# Patient Record
Sex: MALE | Race: WHITE | Employment: UNEMPLOYED | URBAN - METROPOLITAN AREA
[De-identification: names, ages, dates, MRNs, and addresses within clinical notes are randomized per-mention and may not be internally consistent; named-entity substitution may affect disease eponyms.]

---

## 2019-01-17 ENCOUNTER — OFFICE VISIT (OUTPATIENT)
Dept: URGENT CARE | Facility: CLINIC | Age: 54
End: 2019-01-17

## 2019-01-17 VITALS
OXYGEN SATURATION: 95 % | HEART RATE: 80 BPM | WEIGHT: 187 LBS | RESPIRATION RATE: 16 BRPM | BODY MASS INDEX: 26.18 KG/M2 | SYSTOLIC BLOOD PRESSURE: 118 MMHG | TEMPERATURE: 97.2 F | HEIGHT: 71 IN | DIASTOLIC BLOOD PRESSURE: 80 MMHG

## 2019-01-17 DIAGNOSIS — L03.211 FACIAL CELLULITIS: ICD-10-CM

## 2019-01-17 PROCEDURE — 99203 OFFICE O/P NEW LOW 30 MIN: CPT | Performed by: FAMILY MEDICINE

## 2019-01-17 RX ORDER — DOXYCYCLINE HYCLATE 100 MG
100 TABLET ORAL 2 TIMES DAILY
Qty: 20 TAB | Refills: 0 | Status: SHIPPED | OUTPATIENT
Start: 2019-01-17 | End: 2019-01-27

## 2019-01-17 ASSESSMENT — ENCOUNTER SYMPTOMS
FOCAL WEAKNESS: 0
EYE DISCHARGE: 0
WEIGHT LOSS: 0
SINUS PAIN: 0
EYE REDNESS: 0
SENSORY CHANGE: 0

## 2019-01-17 NOTE — PROGRESS NOTES
"Subjective:      Garcia Davidson is a 53 y.o. male who presents with Facial Swelling (x yesterday, Swelling on Lt. side of face)            Onset yesterday pain and swelling centered at his left upper lip.  He has had recent boils associated with his face and neck that have drained pus.  No fever.  No past medical history or family history of MRSA.  No dental pain or gingival swelling.  No association with his eyes.  He has had bilateral symptoms without vesicles.  Pain is mild to moderate.  Swelling is severe.  No respiratory compromise.  No other aggravating or alleviating factors.        Review of Systems   Constitutional: Negative for malaise/fatigue and weight loss.   HENT: Negative for ear discharge, ear pain and sinus pain.    Eyes: Negative for discharge and redness.   Skin: Negative for itching and rash.   Neurological: Negative for sensory change and focal weakness.      .  Medications, Allergies, and current problem list reviewed today in Epic  No known immune compromise     Objective:     /80 (BP Location: Left arm, Patient Position: Sitting, BP Cuff Size: Large adult)   Pulse 80   Temp 36.2 °C (97.2 °F) (Temporal)   Resp 16   Ht 1.803 m (5' 11\")   Wt 84.8 kg (187 lb)   SpO2 95%   BMI 26.08 kg/m²      Physical Exam   Constitutional: He is oriented to person, place, and time. He appears well-developed and well-nourished. No distress.   HENT:   Head: Normocephalic and atraumatic.       Right Ear: External ear normal.   Left Ear: External ear normal.   Eyes: Conjunctivae are normal.   Neck: Neck supple.   Cardiovascular: Normal rate, regular rhythm and normal heart sounds.    Pulmonary/Chest: Effort normal and breath sounds normal.   Lymphadenopathy:     He has no cervical adenopathy.   Neurological: He is alert and oriented to person, place, and time.               Assessment/Plan:     1. Facial cellulitis  doxycycline (VIBRAMYCIN) 100 MG Tab     Differential diagnosis, natural history, " supportive care, and indications for immediate follow-up discussed at length.     I think there is a good possibility that this will require incision and drainage within the next week.  He may come find me at my next scheduled shift tomorrow or Monday.

## 2021-03-15 DIAGNOSIS — Z23 NEED FOR VACCINATION: ICD-10-CM

## 2023-03-07 ENCOUNTER — OFFICE VISIT (OUTPATIENT)
Dept: URGENT CARE | Facility: CLINIC | Age: 58
End: 2023-03-07

## 2023-03-07 VITALS
HEIGHT: 71 IN | HEART RATE: 92 BPM | SYSTOLIC BLOOD PRESSURE: 118 MMHG | TEMPERATURE: 98 F | DIASTOLIC BLOOD PRESSURE: 82 MMHG | RESPIRATION RATE: 16 BRPM | WEIGHT: 200 LBS | OXYGEN SATURATION: 94 % | BODY MASS INDEX: 28 KG/M2

## 2023-03-07 DIAGNOSIS — J22 LRTI (LOWER RESPIRATORY TRACT INFECTION): ICD-10-CM

## 2023-03-07 DIAGNOSIS — J98.01 BRONCHOSPASM: ICD-10-CM

## 2023-03-07 PROCEDURE — 99203 OFFICE O/P NEW LOW 30 MIN: CPT | Performed by: PHYSICIAN ASSISTANT

## 2023-03-07 RX ORDER — DOXYCYCLINE 100 MG/1
100 CAPSULE ORAL 2 TIMES DAILY
Qty: 14 CAPSULE | Refills: 0 | Status: SHIPPED | OUTPATIENT
Start: 2023-03-07 | End: 2023-03-14

## 2023-03-07 RX ORDER — PREDNISONE 20 MG/1
TABLET ORAL
Qty: 5 TABLET | Refills: 0 | Status: SHIPPED | OUTPATIENT
Start: 2023-03-07

## 2023-03-07 ASSESSMENT — ENCOUNTER SYMPTOMS
COUGH: 1
NAUSEA: 0
CHILLS: 1
SPUTUM PRODUCTION: 1
SHORTNESS OF BREATH: 0
WHEEZING: 1
SORE THROAT: 0
DIARRHEA: 0
ABDOMINAL PAIN: 0
FEVER: 1
MYALGIAS: 1
VOMITING: 0

## 2023-03-08 NOTE — PROGRESS NOTES
Subjective:     Garcia Davidson  is a 57 y.o. male who presents for Cough (X 4 days, productive cough, shortness of breath and wheezing)      Cough  This is a new problem. The current episode started in the past 7 days. Associated symptoms include chills (subj), a fever, myalgias and wheezing. Pertinent negatives include no rash, sore throat or shortness of breath.     Patient presents urgent care out of concern for pneumonia.  He describes last 4 days of cough and congestion.  He describes congestion to sinuses as well as chest.  He describes productive sputum as well as wheezing at times.  He notes subjective fevers chills and body aches.  He has not checked his temperature.  Patient denies ear pain.  Denies nausea vomiting abdominal pain diarrhea or rash.  Denies history of asthma or COPD.  Notes past medical history of pneumonia many years ago with this feels similar.  He is concerned for pneumonia today.  He notes history of presumed COVID in early in the pandemic due to loss of taste and smell.  He has not taken a COVID test.  Patient has treated symptoms with electrolyte packets and orange juice.    Review of Systems   Constitutional:  Positive for chills (subj) and fever.   HENT:  Positive for congestion. Negative for sore throat.    Respiratory:  Positive for cough, sputum production and wheezing. Negative for shortness of breath.    Gastrointestinal:  Negative for abdominal pain, diarrhea, nausea and vomiting.   Musculoskeletal:  Positive for myalgias.   Skin:  Negative for rash.     Medications:    This patient does not have an active medication from one of the medication groupers.    Allergies: Patient has no known allergies.    Problem List: Garcia Davidson does not have a problem list on file.    Surgical History:  No past surgical history on file.    Past Social Hx: Garcia Davidson  reports that he has quit smoking. He has never used smokeless tobacco.     Past Family Hx:  Garcia Davidson  "family history is not on file.     Problem list, medications, and allergies reviewed by myself today in Epic.     Objective:   /82 (BP Location: Left arm, Patient Position: Sitting, BP Cuff Size: Large adult)   Pulse 92   Temp 36.7 °C (98 °F) (Temporal)   Resp 16   Ht 1.803 m (5' 11\")   Wt 90.7 kg (200 lb)   SpO2 94%   BMI 27.89 kg/m²     Physical Exam  Vitals and nursing note reviewed.   Constitutional:       General: He is not in acute distress.     Appearance: Normal appearance. He is well-developed. He is not diaphoretic.   HENT:      Head: Normocephalic and atraumatic.      Right Ear: External ear normal.      Left Ear: External ear normal.      Nose: Nose normal.   Eyes:      General: No scleral icterus.        Right eye: No discharge.         Left eye: No discharge.      Conjunctiva/sclera: Conjunctivae normal.   Pulmonary:      Effort: Pulmonary effort is normal. No accessory muscle usage or respiratory distress.      Breath sounds: Rhonchi (mild) present. No decreased breath sounds, wheezing or rales.   Musculoskeletal:         General: Normal range of motion.      Cervical back: Neck supple.   Skin:     General: Skin is warm and dry.      Coloration: Skin is not pale.   Neurological:      Mental Status: He is alert and oriented to person, place, and time.      Coordination: Coordination normal.     Patient is cash pay and states he would like to avoid additional expenses.  Will avoid PCR COVID testing and chest x-ray due to cost restriction for patient.    Assessment/Plan:   Assessment      1. LRTI (lower respiratory tract infection)  - doxycycline (MONODOX) 100 MG capsule; Take 1 Capsule by mouth 2 times a day for 7 days.  Dispense: 14 Capsule; Refill: 0    2. Bronchospasm  - predniSONE (DELTASONE) 20 MG Tab; Take one tab PO qd x 5d  Dispense: 5 Tablet; Refill: 0  Supportive care is reviewed with patient/caregiver - recommend to push PO fluids and electrolytes, Cautioned regarding potential " side effects of steroid, avoid nsaids while using   take full course of Rx, take with probiotics, observe for resolution  Return to clinic with lack of resolution or progression of symptoms.  ER precautions with any worsening symptoms are reviewed with patient/caregiver and they do express understanding    I have worn an N95 mask, gloves and eye protection for the entire encounter with this patient.     Differential diagnosis, natural history, supportive care, and indications for immediate follow-up discussed.

## 2024-03-03 ENCOUNTER — APPOINTMENT (OUTPATIENT)
Dept: RADIOLOGY | Facility: MEDICAL CENTER | Age: 59
DRG: 897 | End: 2024-03-03
Attending: INTERNAL MEDICINE

## 2024-03-03 ENCOUNTER — HOSPITAL ENCOUNTER (INPATIENT)
Facility: MEDICAL CENTER | Age: 59
LOS: 1 days | DRG: 897 | End: 2024-03-04
Attending: EMERGENCY MEDICINE | Admitting: INTERNAL MEDICINE

## 2024-03-03 ENCOUNTER — APPOINTMENT (OUTPATIENT)
Dept: RADIOLOGY | Facility: MEDICAL CENTER | Age: 59
DRG: 897 | End: 2024-03-03
Attending: EMERGENCY MEDICINE

## 2024-03-03 ENCOUNTER — APPOINTMENT (OUTPATIENT)
Dept: CARDIOLOGY | Facility: MEDICAL CENTER | Age: 59
DRG: 897 | End: 2024-03-03
Attending: INTERNAL MEDICINE

## 2024-03-03 DIAGNOSIS — R42 DIZZINESS: ICD-10-CM

## 2024-03-03 DIAGNOSIS — R06.00 DYSPNEA, UNSPECIFIED TYPE: ICD-10-CM

## 2024-03-03 DIAGNOSIS — R11.2 NAUSEA AND VOMITING, UNSPECIFIED VOMITING TYPE: ICD-10-CM

## 2024-03-03 DIAGNOSIS — R07.9 CHEST PAIN, UNSPECIFIED TYPE: ICD-10-CM

## 2024-03-03 DIAGNOSIS — J90 PLEURAL EFFUSION: ICD-10-CM

## 2024-03-03 DIAGNOSIS — E87.1 HYPONATREMIA: ICD-10-CM

## 2024-03-03 PROBLEM — R94.31 QT PROLONGATION: Status: ACTIVE | Noted: 2024-03-03

## 2024-03-03 PROBLEM — R07.89 OTHER CHEST PAIN: Status: ACTIVE | Noted: 2024-03-03

## 2024-03-03 PROBLEM — F10.29 ALCOHOL DEPENDENCE WITH UNSPECIFIED ALCOHOL-INDUCED DISORDER (HCC): Status: ACTIVE | Noted: 2024-03-03

## 2024-03-03 LAB
ABO + RH BLD: NORMAL
ABO GROUP BLD: NORMAL
ALBUMIN SERPL BCP-MCNC: 3.9 G/DL (ref 3.2–4.9)
ALBUMIN/GLOB SERPL: 1.1 G/DL
ALP SERPL-CCNC: 107 U/L (ref 30–99)
ALT SERPL-CCNC: 17 U/L (ref 2–50)
AMPHET UR QL SCN: NEGATIVE
ANION GAP SERPL CALC-SCNC: 18 MMOL/L (ref 7–16)
APTT PPP: 25.5 SEC (ref 24.7–36)
AST SERPL-CCNC: 33 U/L (ref 12–45)
BARBITURATES UR QL SCN: NEGATIVE
BASOPHILS # BLD AUTO: 0.3 % (ref 0–1.8)
BASOPHILS # BLD: 0.03 K/UL (ref 0–0.12)
BENZODIAZ UR QL SCN: NEGATIVE
BILIRUB SERPL-MCNC: 1.4 MG/DL (ref 0.1–1.5)
BLD GP AB SCN SERPL QL: NORMAL
BUN SERPL-MCNC: 7 MG/DL (ref 8–22)
BZE UR QL SCN: POSITIVE
CALCIUM ALBUM COR SERPL-MCNC: 8.5 MG/DL (ref 8.5–10.5)
CALCIUM SERPL-MCNC: 8.4 MG/DL (ref 8.5–10.5)
CANNABINOIDS UR QL SCN: NEGATIVE
CHLORIDE SERPL-SCNC: 92 MMOL/L (ref 96–112)
CO2 SERPL-SCNC: 19 MMOL/L (ref 20–33)
CREAT SERPL-MCNC: 0.66 MG/DL (ref 0.5–1.4)
EKG IMPRESSION: NORMAL
EOSINOPHIL # BLD AUTO: 0 K/UL (ref 0–0.51)
EOSINOPHIL NFR BLD: 0 % (ref 0–6.9)
ERYTHROCYTE [DISTWIDTH] IN BLOOD BY AUTOMATED COUNT: 39.2 FL (ref 35.9–50)
EST. AVERAGE GLUCOSE BLD GHB EST-MCNC: 111 MG/DL
ETHANOL BLD-MCNC: <10.1 MG/DL
FENTANYL UR QL: NEGATIVE
GFR SERPLBLD CREATININE-BSD FMLA CKD-EPI: 107 ML/MIN/1.73 M 2
GLOBULIN SER CALC-MCNC: 3.4 G/DL (ref 1.9–3.5)
GLUCOSE SERPL-MCNC: 129 MG/DL (ref 65–99)
HBA1C MFR BLD: 5.5 % (ref 4–5.6)
HCT VFR BLD AUTO: 45.1 % (ref 42–52)
HGB BLD-MCNC: 16.8 G/DL (ref 14–18)
IMM GRANULOCYTES # BLD AUTO: 0.04 K/UL (ref 0–0.11)
IMM GRANULOCYTES NFR BLD AUTO: 0.3 % (ref 0–0.9)
INR PPP: 1.02 (ref 0.87–1.13)
LIPASE SERPL-CCNC: 15 U/L (ref 11–82)
LV EJECT FRACT  99904: 60
LV EJECT FRACT MOD 2C 99903: 74.78
LV EJECT FRACT MOD 4C 99902: 59.13
LV EJECT FRACT MOD BP 99901: 66.63
LYMPHOCYTES # BLD AUTO: 0.86 K/UL (ref 1–4.8)
LYMPHOCYTES NFR BLD: 7.2 % (ref 22–41)
MAGNESIUM SERPL-MCNC: 1.5 MG/DL (ref 1.5–2.5)
MCH RBC QN AUTO: 32.4 PG (ref 27–33)
MCHC RBC AUTO-ENTMCNC: 37.3 G/DL (ref 32.3–36.5)
MCV RBC AUTO: 86.9 FL (ref 81.4–97.8)
METHADONE UR QL SCN: NEGATIVE
MONOCYTES # BLD AUTO: 0.67 K/UL (ref 0–0.85)
MONOCYTES NFR BLD AUTO: 5.6 % (ref 0–13.4)
NEUTROPHILS # BLD AUTO: 10.37 K/UL (ref 1.82–7.42)
NEUTROPHILS NFR BLD: 86.6 % (ref 44–72)
NRBC # BLD AUTO: 0 K/UL
NRBC BLD-RTO: 0 /100 WBC (ref 0–0.2)
NT-PROBNP SERPL IA-MCNC: 118 PG/ML (ref 0–125)
OPIATES UR QL SCN: NEGATIVE
OSMOLALITY UR: 335 MOSM/KG H2O (ref 300–900)
OXYCODONE UR QL SCN: NEGATIVE
PCP UR QL SCN: NEGATIVE
PLATELET # BLD AUTO: 264 K/UL (ref 164–446)
PMV BLD AUTO: 10.1 FL (ref 9–12.9)
POTASSIUM SERPL-SCNC: 3.7 MMOL/L (ref 3.6–5.5)
PROCALCITONIN SERPL-MCNC: <0.05 NG/ML
PROPOXYPH UR QL SCN: NEGATIVE
PROT SERPL-MCNC: 7.3 G/DL (ref 6–8.2)
PROTHROMBIN TIME: 13.5 SEC (ref 12–14.6)
RBC # BLD AUTO: 5.19 M/UL (ref 4.7–6.1)
RH BLD: NORMAL
SODIUM SERPL-SCNC: 128 MMOL/L (ref 135–145)
SODIUM SERPL-SCNC: 129 MMOL/L (ref 135–145)
SODIUM SERPL-SCNC: 136 MMOL/L (ref 135–145)
TROPONIN T SERPL-MCNC: 10 NG/L (ref 6–19)
TROPONIN T SERPL-MCNC: 10 NG/L (ref 6–19)
TROPONIN T SERPL-MCNC: 8 NG/L (ref 6–19)
WBC # BLD AUTO: 12 K/UL (ref 4.8–10.8)

## 2024-03-03 PROCEDURE — 36415 COLL VENOUS BLD VENIPUNCTURE: CPT

## 2024-03-03 PROCEDURE — 83036 HEMOGLOBIN GLYCOSYLATED A1C: CPT

## 2024-03-03 PROCEDURE — 770020 HCHG ROOM/CARE - TELE (206)

## 2024-03-03 PROCEDURE — 93005 ELECTROCARDIOGRAM TRACING: CPT | Performed by: EMERGENCY MEDICINE

## 2024-03-03 PROCEDURE — 84295 ASSAY OF SERUM SODIUM: CPT

## 2024-03-03 PROCEDURE — 82077 ASSAY SPEC XCP UR&BREATH IA: CPT

## 2024-03-03 PROCEDURE — 85610 PROTHROMBIN TIME: CPT

## 2024-03-03 PROCEDURE — 700102 HCHG RX REV CODE 250 W/ 637 OVERRIDE(OP): Performed by: INTERNAL MEDICINE

## 2024-03-03 PROCEDURE — 99285 EMERGENCY DEPT VISIT HI MDM: CPT

## 2024-03-03 PROCEDURE — 85730 THROMBOPLASTIN TIME PARTIAL: CPT

## 2024-03-03 PROCEDURE — 99223 1ST HOSP IP/OBS HIGH 75: CPT | Performed by: INTERNAL MEDICINE

## 2024-03-03 PROCEDURE — 83690 ASSAY OF LIPASE: CPT

## 2024-03-03 PROCEDURE — A9270 NON-COVERED ITEM OR SERVICE: HCPCS | Performed by: INTERNAL MEDICINE

## 2024-03-03 PROCEDURE — 84145 PROCALCITONIN (PCT): CPT

## 2024-03-03 PROCEDURE — 70450 CT HEAD/BRAIN W/O DYE: CPT

## 2024-03-03 PROCEDURE — 86850 RBC ANTIBODY SCREEN: CPT

## 2024-03-03 PROCEDURE — 93306 TTE W/DOPPLER COMPLETE: CPT

## 2024-03-03 PROCEDURE — 700105 HCHG RX REV CODE 258: Performed by: INTERNAL MEDICINE

## 2024-03-03 PROCEDURE — 83935 ASSAY OF URINE OSMOLALITY: CPT

## 2024-03-03 PROCEDURE — 700101 HCHG RX REV CODE 250: Performed by: INTERNAL MEDICINE

## 2024-03-03 PROCEDURE — 93005 ELECTROCARDIOGRAM TRACING: CPT

## 2024-03-03 PROCEDURE — 70498 CT ANGIOGRAPHY NECK: CPT

## 2024-03-03 PROCEDURE — 80053 COMPREHEN METABOLIC PANEL: CPT

## 2024-03-03 PROCEDURE — HZ2ZZZZ DETOXIFICATION SERVICES FOR SUBSTANCE ABUSE TREATMENT: ICD-10-PCS | Performed by: INTERNAL MEDICINE

## 2024-03-03 PROCEDURE — 700111 HCHG RX REV CODE 636 W/ 250 OVERRIDE (IP): Mod: JZ | Performed by: EMERGENCY MEDICINE

## 2024-03-03 PROCEDURE — 700105 HCHG RX REV CODE 258: Performed by: EMERGENCY MEDICINE

## 2024-03-03 PROCEDURE — 96375 TX/PRO/DX INJ NEW DRUG ADDON: CPT

## 2024-03-03 PROCEDURE — 86901 BLOOD TYPING SEROLOGIC RH(D): CPT

## 2024-03-03 PROCEDURE — 700117 HCHG RX CONTRAST REV CODE 255: Performed by: EMERGENCY MEDICINE

## 2024-03-03 PROCEDURE — 84484 ASSAY OF TROPONIN QUANT: CPT

## 2024-03-03 PROCEDURE — 86900 BLOOD TYPING SEROLOGIC ABO: CPT

## 2024-03-03 PROCEDURE — 93306 TTE W/DOPPLER COMPLETE: CPT | Mod: 26 | Performed by: INTERNAL MEDICINE

## 2024-03-03 PROCEDURE — 85025 COMPLETE CBC W/AUTO DIFF WBC: CPT

## 2024-03-03 PROCEDURE — 0042T CT-CEREBRAL PERFUSION ANALYSIS: CPT

## 2024-03-03 PROCEDURE — 70496 CT ANGIOGRAPHY HEAD: CPT

## 2024-03-03 PROCEDURE — 76604 US EXAM CHEST: CPT

## 2024-03-03 PROCEDURE — 83880 ASSAY OF NATRIURETIC PEPTIDE: CPT

## 2024-03-03 PROCEDURE — 700111 HCHG RX REV CODE 636 W/ 250 OVERRIDE (IP): Mod: JZ | Performed by: INTERNAL MEDICINE

## 2024-03-03 PROCEDURE — 96365 THER/PROPH/DIAG IV INF INIT: CPT

## 2024-03-03 PROCEDURE — 83735 ASSAY OF MAGNESIUM: CPT

## 2024-03-03 PROCEDURE — 80307 DRUG TEST PRSMV CHEM ANLYZR: CPT

## 2024-03-03 PROCEDURE — 71045 X-RAY EXAM CHEST 1 VIEW: CPT

## 2024-03-03 RX ORDER — POLYETHYLENE GLYCOL 3350 17 G/17G
1 POWDER, FOR SOLUTION ORAL
Status: DISCONTINUED | OUTPATIENT
Start: 2024-03-03 | End: 2024-03-04 | Stop reason: HOSPADM

## 2024-03-03 RX ORDER — LORAZEPAM 1 MG/1
1 TABLET ORAL EVERY 4 HOURS PRN
Status: DISCONTINUED | OUTPATIENT
Start: 2024-03-03 | End: 2024-03-04 | Stop reason: HOSPADM

## 2024-03-03 RX ORDER — ONDANSETRON 2 MG/ML
4 INJECTION INTRAMUSCULAR; INTRAVENOUS EVERY 4 HOURS PRN
Status: DISCONTINUED | OUTPATIENT
Start: 2024-03-03 | End: 2024-03-03

## 2024-03-03 RX ORDER — LORAZEPAM 1 MG/1
0.5 TABLET ORAL EVERY 4 HOURS PRN
Status: DISCONTINUED | OUTPATIENT
Start: 2024-03-03 | End: 2024-03-04 | Stop reason: HOSPADM

## 2024-03-03 RX ORDER — SODIUM CHLORIDE 9 MG/ML
1000 INJECTION, SOLUTION INTRAVENOUS ONCE
Status: COMPLETED | OUTPATIENT
Start: 2024-03-03 | End: 2024-03-03

## 2024-03-03 RX ORDER — AMOXICILLIN 250 MG
2 CAPSULE ORAL EVERY EVENING
Status: DISCONTINUED | OUTPATIENT
Start: 2024-03-03 | End: 2024-03-04 | Stop reason: HOSPADM

## 2024-03-03 RX ORDER — ALUMINA, MAGNESIA, AND SIMETHICONE 2400; 2400; 240 MG/30ML; MG/30ML; MG/30ML
30 SUSPENSION ORAL EVERY 4 HOURS PRN
Status: DISCONTINUED | OUTPATIENT
Start: 2024-03-03 | End: 2024-03-04 | Stop reason: HOSPADM

## 2024-03-03 RX ORDER — CLONIDINE HYDROCHLORIDE 0.1 MG/1
0.1 TABLET ORAL EVERY 4 HOURS PRN
Status: DISCONTINUED | OUTPATIENT
Start: 2024-03-03 | End: 2024-03-04 | Stop reason: HOSPADM

## 2024-03-03 RX ORDER — FOLIC ACID 1 MG/1
1 TABLET ORAL DAILY
Status: DISCONTINUED | OUTPATIENT
Start: 2024-03-04 | End: 2024-03-04 | Stop reason: HOSPADM

## 2024-03-03 RX ORDER — CHLORDIAZEPOXIDE HYDROCHLORIDE 25 MG/1
50 CAPSULE, GELATIN COATED ORAL EVERY 6 HOURS
Status: DISPENSED | OUTPATIENT
Start: 2024-03-03 | End: 2024-03-04

## 2024-03-03 RX ORDER — PROCHLORPERAZINE EDISYLATE 5 MG/ML
5-10 INJECTION INTRAMUSCULAR; INTRAVENOUS EVERY 4 HOURS PRN
Status: DISCONTINUED | OUTPATIENT
Start: 2024-03-03 | End: 2024-03-03

## 2024-03-03 RX ORDER — ONDANSETRON 2 MG/ML
4 INJECTION INTRAMUSCULAR; INTRAVENOUS ONCE
Status: COMPLETED | OUTPATIENT
Start: 2024-03-03 | End: 2024-03-03

## 2024-03-03 RX ORDER — PROMETHAZINE HYDROCHLORIDE 25 MG/1
12.5-25 TABLET ORAL EVERY 4 HOURS PRN
Status: DISCONTINUED | OUTPATIENT
Start: 2024-03-03 | End: 2024-03-04 | Stop reason: HOSPADM

## 2024-03-03 RX ORDER — CHLORDIAZEPOXIDE HYDROCHLORIDE 25 MG/1
25 CAPSULE, GELATIN COATED ORAL EVERY 6 HOURS
Status: DISCONTINUED | OUTPATIENT
Start: 2024-03-04 | End: 2024-03-04 | Stop reason: HOSPADM

## 2024-03-03 RX ORDER — ASPIRIN 81 MG/1
81 TABLET ORAL DAILY
Status: DISCONTINUED | OUTPATIENT
Start: 2024-03-03 | End: 2024-03-04 | Stop reason: HOSPADM

## 2024-03-03 RX ORDER — PROMETHAZINE HYDROCHLORIDE 25 MG/1
12.5-25 SUPPOSITORY RECTAL EVERY 4 HOURS PRN
Status: DISCONTINUED | OUTPATIENT
Start: 2024-03-03 | End: 2024-03-03

## 2024-03-03 RX ORDER — LORAZEPAM 2 MG/1
4 TABLET ORAL
Status: DISCONTINUED | OUTPATIENT
Start: 2024-03-03 | End: 2024-03-04 | Stop reason: HOSPADM

## 2024-03-03 RX ORDER — ONDANSETRON 4 MG/1
4 TABLET, ORALLY DISINTEGRATING ORAL EVERY 4 HOURS PRN
Status: DISCONTINUED | OUTPATIENT
Start: 2024-03-03 | End: 2024-03-03

## 2024-03-03 RX ORDER — ENOXAPARIN SODIUM 100 MG/ML
40 INJECTION SUBCUTANEOUS DAILY
Status: DISCONTINUED | OUTPATIENT
Start: 2024-03-03 | End: 2024-03-04 | Stop reason: HOSPADM

## 2024-03-03 RX ORDER — DIAZEPAM 5 MG/ML
10 INJECTION, SOLUTION INTRAMUSCULAR; INTRAVENOUS
Status: DISCONTINUED | OUTPATIENT
Start: 2024-03-03 | End: 2024-03-04 | Stop reason: HOSPADM

## 2024-03-03 RX ORDER — ACETAMINOPHEN 325 MG/1
650 TABLET ORAL EVERY 6 HOURS PRN
Status: DISCONTINUED | OUTPATIENT
Start: 2024-03-03 | End: 2024-03-04 | Stop reason: HOSPADM

## 2024-03-03 RX ORDER — GAUZE BANDAGE 2" X 2"
100 BANDAGE TOPICAL DAILY
Status: DISCONTINUED | OUTPATIENT
Start: 2024-03-04 | End: 2024-03-04 | Stop reason: HOSPADM

## 2024-03-03 RX ORDER — LORAZEPAM 2 MG/1
2 TABLET ORAL
Status: DISCONTINUED | OUTPATIENT
Start: 2024-03-03 | End: 2024-03-04 | Stop reason: HOSPADM

## 2024-03-03 RX ORDER — LORAZEPAM 2 MG/ML
1 INJECTION INTRAMUSCULAR
Status: COMPLETED | OUTPATIENT
Start: 2024-03-03 | End: 2024-03-04

## 2024-03-03 RX ADMIN — AMPICILLIN AND SULBACTAM 3 G: 1; 2 INJECTION, POWDER, FOR SOLUTION INTRAMUSCULAR; INTRAVENOUS at 12:10

## 2024-03-03 RX ADMIN — ASPIRIN 81 MG: 81 TABLET, COATED ORAL at 09:38

## 2024-03-03 RX ADMIN — IOHEXOL 40 ML: 350 INJECTION, SOLUTION INTRAVENOUS at 07:30

## 2024-03-03 RX ADMIN — AMPICILLIN AND SULBACTAM 3 G: 1; 2 INJECTION, POWDER, FOR SOLUTION INTRAMUSCULAR; INTRAVENOUS at 23:22

## 2024-03-03 RX ADMIN — THIAMINE HYDROCHLORIDE: 100 INJECTION, SOLUTION INTRAMUSCULAR; INTRAVENOUS at 09:42

## 2024-03-03 RX ADMIN — CHLORDIAZEPOXIDE HYDROCHLORIDE 50 MG: 25 CAPSULE ORAL at 10:47

## 2024-03-03 RX ADMIN — IOHEXOL 80 ML: 350 INJECTION, SOLUTION INTRAVENOUS at 07:12

## 2024-03-03 RX ADMIN — AMPICILLIN AND SULBACTAM 3 G: 1; 2 INJECTION, POWDER, FOR SOLUTION INTRAMUSCULAR; INTRAVENOUS at 17:33

## 2024-03-03 RX ADMIN — LIDOCAINE HYDROCHLORIDE 15 ML: 20 SOLUTION OROPHARYNGEAL at 09:38

## 2024-03-03 RX ADMIN — ONDANSETRON 4 MG: 2 INJECTION INTRAMUSCULAR; INTRAVENOUS at 06:45

## 2024-03-03 RX ADMIN — CLONIDINE HYDROCHLORIDE 0.1 MG: 0.1 TABLET ORAL at 12:16

## 2024-03-03 RX ADMIN — ENOXAPARIN SODIUM 40 MG: 100 INJECTION SUBCUTANEOUS at 17:29

## 2024-03-03 RX ADMIN — CHLORDIAZEPOXIDE HYDROCHLORIDE 50 MG: 25 CAPSULE ORAL at 23:21

## 2024-03-03 RX ADMIN — SODIUM CHLORIDE 1000 ML: 9 INJECTION, SOLUTION INTRAVENOUS at 06:45

## 2024-03-03 RX ADMIN — PROMETHAZINE HYDROCHLORIDE 25 MG: 25 TABLET ORAL at 11:51

## 2024-03-03 RX ADMIN — LORAZEPAM 1 MG: 1 TABLET ORAL at 11:51

## 2024-03-03 ASSESSMENT — COPD QUESTIONNAIRES
DO YOU EVER COUGH UP ANY MUCUS OR PHLEGM?: NO/ONLY WITH OCCASIONAL COLDS OR INFECTIONS
HAVE YOU SMOKED AT LEAST 100 CIGARETTES IN YOUR ENTIRE LIFE: YES
DURING THE PAST 4 WEEKS HOW MUCH DID YOU FEEL SHORT OF BREATH: NONE/LITTLE OF THE TIME
COPD SCREENING SCORE: 4

## 2024-03-03 ASSESSMENT — LIFESTYLE VARIABLES
CONSUMPTION TOTAL: POSITIVE
AUDITORY DISTURBANCES: NOT PRESENT
VISUAL DISTURBANCES: NOT PRESENT
ANXIETY: NO ANXIETY (AT EASE)
AUDITORY DISTURBANCES: NOT PRESENT
AUDITORY DISTURBANCES: NOT PRESENT
PAROXYSMAL SWEATS: NO SWEAT VISIBLE
HEADACHE, FULLNESS IN HEAD: NOT PRESENT
TOTAL SCORE: 1
AGITATION: NORMAL ACTIVITY
EVER FELT BAD OR GUILTY ABOUT YOUR DRINKING: NO
AGITATION: NORMAL ACTIVITY
AUDITORY DISTURBANCES: NOT PRESENT
PAROXYSMAL SWEATS: NO SWEAT VISIBLE
TREMOR: *
ON A TYPICAL DAY WHEN YOU DRINK ALCOHOL HOW MANY DRINKS DO YOU HAVE: 6
ANXIETY: NO ANXIETY (AT EASE)
HAVE PEOPLE ANNOYED YOU BY CRITICIZING YOUR DRINKING: NO
TREMOR: NO TREMOR
TREMOR: NO TREMOR
PAROXYSMAL SWEATS: NO SWEAT VISIBLE
DOES PATIENT WANT TO STOP DRINKING: YES
TOTAL SCORE: 1
DOES PATIENT WANT TO TALK TO SOMEONE ABOUT QUITTING: NO
ORIENTATION AND CLOUDING OF SENSORIUM: ORIENTED AND CAN DO SERIAL ADDITIONS
HAVE YOU EVER FELT YOU SHOULD CUT DOWN ON YOUR DRINKING: YES
TOTAL SCORE: 3
AVERAGE NUMBER OF DAYS PER WEEK YOU HAVE A DRINK CONTAINING ALCOHOL: 2
ORIENTATION AND CLOUDING OF SENSORIUM: ORIENTED AND CAN DO SERIAL ADDITIONS
HEADACHE, FULLNESS IN HEAD: MILD
ORIENTATION AND CLOUDING OF SENSORIUM: ORIENTED AND CAN DO SERIAL ADDITIONS
NAUSEA AND VOMITING: NO NAUSEA AND NO VOMITING
NAUSEA AND VOMITING: INTERMITTENT NAUSEA WITH DRY HEAVES
AGITATION: NORMAL ACTIVITY
ANXIETY: NO ANXIETY (AT EASE)
ORIENTATION AND CLOUDING OF SENSORIUM: ORIENTED AND CAN DO SERIAL ADDITIONS
PAROXYSMAL SWEATS: NO SWEAT VISIBLE
TOTAL SCORE: 0
HEADACHE, FULLNESS IN HEAD: VERY MILD
TOTAL SCORE: 1
VISUAL DISTURBANCES: NOT PRESENT
TOTAL SCORE: 0
NAUSEA AND VOMITING: NO NAUSEA AND NO VOMITING
HEADACHE, FULLNESS IN HEAD: NOT PRESENT
NAUSEA AND VOMITING: MILD NAUSEA WITH NO VOMITING
VISUAL DISTURBANCES: NOT PRESENT
TREMOR: TREMOR NOT VISIBLE BUT CAN BE FELT, FINGERTIP TO FINGERTIP
TOTAL SCORE: 9
ALCOHOL_USE: YES
ANXIETY: NO ANXIETY (AT EASE)
HOW MANY TIMES IN THE PAST YEAR HAVE YOU HAD 5 OR MORE DRINKS IN A DAY: 6
VISUAL DISTURBANCES: NOT PRESENT
AGITATION: NORMAL ACTIVITY
EVER HAD A DRINK FIRST THING IN THE MORNING TO STEADY YOUR NERVES TO GET RID OF A HANGOVER: NO

## 2024-03-03 ASSESSMENT — COGNITIVE AND FUNCTIONAL STATUS - GENERAL
DAILY ACTIVITIY SCORE: 24
WALKING IN HOSPITAL ROOM: A LITTLE
SUGGESTED CMS G CODE MODIFIER DAILY ACTIVITY: CH
SUGGESTED CMS G CODE MODIFIER MOBILITY: CJ
MOBILITY SCORE: 22
CLIMB 3 TO 5 STEPS WITH RAILING: A LITTLE

## 2024-03-03 ASSESSMENT — ENCOUNTER SYMPTOMS
EYE PAIN: 0
BLOOD IN STOOL: 0
TREMORS: 0
CHILLS: 0
NERVOUS/ANXIOUS: 0
CONSTIPATION: 0
WHEEZING: 0
LOSS OF CONSCIOUSNESS: 0
INSOMNIA: 0
EYE REDNESS: 0
COUGH: 0
DIZZINESS: 1
PALPITATIONS: 0
FEVER: 0
HEADACHES: 0
WEAKNESS: 0
ABDOMINAL PAIN: 0
HEMOPTYSIS: 0
VOMITING: 1
SHORTNESS OF BREATH: 0
MYALGIAS: 0
NAUSEA: 1
DIARRHEA: 0
SEIZURES: 0
FALLS: 0
FOCAL WEAKNESS: 0

## 2024-03-03 ASSESSMENT — FIBROSIS 4 INDEX
FIB4 SCORE: 1.82
FIB4 SCORE: 1.82

## 2024-03-03 ASSESSMENT — PATIENT HEALTH QUESTIONNAIRE - PHQ9
2. FEELING DOWN, DEPRESSED, IRRITABLE, OR HOPELESS: NOT AT ALL
1. LITTLE INTEREST OR PLEASURE IN DOING THINGS: NOT AT ALL
SUM OF ALL RESPONSES TO PHQ9 QUESTIONS 1 AND 2: 0

## 2024-03-03 ASSESSMENT — PAIN DESCRIPTION - PAIN TYPE
TYPE: VISCERAL PAIN
TYPE: ACUTE PAIN

## 2024-03-03 NOTE — PROGRESS NOTES
4 Eyes Skin Assessment Completed by BENJIE ferreira and BENJIE castro.    Head Blanching and Redness  Ears WDL  Nose WDL  Mouth WDL  Neck WDL  Breast/Chest WDL  Shoulder Blades WDL  Spine WDL  (R) Arm/Elbow/Hand Redness and Blanching  (L) Arm/Elbow/Hand Redness and Blanching  Abdomen WDL  Groin WDL  Scrotum/Coccyx/Buttocks WDL  (R) Leg WDL  (L) Leg WDL  (R) Heel/Foot/Toe Redness and Blanching  (L) Heel/Foot/Toe Redness and Blanching          Devices In Places Tele Box, Blood Pressure Cuff, and Pulse Ox      Interventions In Place Pillows    Possible Skin Injury No    Pictures Uploaded Into Epic N/A  Wound Consult Placed N/A  RN Wound Prevention Protocol Ordered No

## 2024-03-03 NOTE — ASSESSMENT & PLAN NOTE
Rule out infective cause.  Procalcitonin, empiric antibiotics (ideally before thoracentesis), US thoracentesis

## 2024-03-03 NOTE — ED TRIAGE NOTES
"Chief Complaint   Patient presents with    Dizziness     Dizziness x2 hours PTA.   BP (!) 152/97   Pulse 84   Temp 36.9 °C (98.4 °F) (Temporal)   Resp 20   Ht 1.803 m (5' 11\")   Wt 90.7 kg (200 lb)   SpO2 92%   BMI 27.89 kg/m²     Pt BIBA from home. Pt was sitting on the couch, watching TV, when he suddenly became dizzy. Pt reports associated nausea. Denies CP, SOB, or HA.    EMS established and IV and gave 4mg zofran.    Pt reports he is a daily drinker, last drink x2 days ago, usually 12-18 beers per day. Pt also reports cocaine use x2 days.   "

## 2024-03-03 NOTE — CARE PLAN
The patient is Stable - Low risk of patient condition declining or worsening    Shift Goals  Clinical Goals: safety, stable neuro assessment  Patient Goals: comfort    Progress made toward(s) clinical / shift goals:  safety precautions in place, neuro status remains stable, PRNs fro BP and CIWA in use     Problem: Neuro Status  Goal: Neuro status will remain stable or improve  Description: Target End Date:  Prior to discharge or change in level of care    Document on Neuro assessment in the Assessment flowsheet    1.  Assess and monitor neurologic status per provider order/protocol/unit policy  2.  Assess level of consciousness and orientation  3.  Assess for speech, dysarthria, dysphagia, facial symmetry  4.  Assess visual field, eye movements, gaze preference, pupil reaction and size  5.  Assess muscle strength and motor response in all four extremities  6.  Assess for sensation (numbness and tingling)  7.  Assess basic neuro reflexes (cough, gag, corneal)  8.  Identify changes in neuro status and report to provider for testing/treatment orders  Outcome: Progressing  Flowsheets (Taken 3/3/2024 1238)  Level of Consciousness: Alert     Problem: Urinary Elimination  Goal: Establish and maintain regular urinary output  Description: Target End Date:  Prior to discharge or change in level of care    Document on I/O and Assessment flowsheets    1.  Evaluate need to continue indwelling catheter every shift  2.  Assess signs and symptoms of urinary retention  3.  Assess post-void residual volumes  4.  Implement bladder training program  5.  Encourage scheduled voidings  6.  Assist patient to sit on bedside commode or toilet for voiding  7.  Educate patient and family/caregiver on use and purpose of urine collection devices (document in Patient Education)  Outcome: Progressing     Problem: Mobility - Stroke  Goal: Patient's capacity to carry out activities will improve  Description: Target End Date:  Prior to discharge or  change in level of care    1.  Assess for barriers to mobility/activity  2.  Implement activity per interdisciplinary team recommendations  3.  Target activity level identified and patient/family/caregiver aware of goal  4.  Provide assistive devices  5.  Instruct patient/caregiver on proper use of assistive/adaptive devices  6.  Schedule activities and rest periods to decrease effects of fatigue  7.  Encourage mobilization to extent of ability  8.  Maintain proper body alignment  9.  Provide adequate pain management to allow progressive mobilization  10. Implement pace maker precautions as needed  Outcome: Progressing     Problem: Seizure Precautions  Goal: Implementation of seizure precautions  Description: Target End Date:  Prior to discharge or change in level of care    1.  Padded side rails up at all times  2.  Suction equipment and oxygen delivery system at bedside  3.  Continuous pulse oximeter in use  4.  Implement fall precautions, bed alarm on, bed in lowest position  5.  IV access (per order)  6.  Provide low stimulus environment, avoid exposure to triggers  7.  Instruct patient to use call light/seizure button if having warning signs of impending seizure  Outcome: Progressing  Note: In place          Patient is not progressing towards the following goals:

## 2024-03-03 NOTE — H&P
"Hospital Medicine History & Physical Note    Date of Service  3/3/2024    Primary Care Physician  Pcp Not In Computer    Consultants    Code Status  No Order    Chief Complaint  Chief Complaint   Patient presents with    Dizziness     Dizziness x2 hours PTA.       History of Presenting Illness  Garcia Davidson is a 58 y.o. male who presented 3/3/2024 with Dizziness (Dizziness x2 hours PTA.)  58 year old male who vapes, drinks alcohol heavily (12pack beer a night last drank night prior to admission) and does cocaine (used last night), presented 3/3/2024 sudden onset nausea, vomiting and dizziness.  At 3AM, he felt L sided chest pain, nauseous, retched, dizzy and shaky. No headaches, fever, chills or cough. No history of stroke or heart attacks. His brother and dad have history of heart attack.   At the ED, afebrile, hemodynamically stable. Mild hypoxia at 90%  CXR on my personal review showed moderate L pleural effusion.  EKG on my personal review nonspecific ST changes with no obvious sign of ischemia.  Mild leukocytosis. Na 129, bicarb 19, Cr- 0.66. Troponin negative, BNP, within normal limits  Neurologically nonfocal, tremors per EDP. \"Stroke work-up\" initiated for dizziness, CT head no acute process, CTA head and neck showed no significant stenosis, acute arterial occlusion, or aneurysm. No significant carotid or vertebral stenosis. CT perfusion showed mismatch volume showing \"ischemic brain/penumbra\" at 3mL  When I saw him at ED no acute distress. No gross motor deficits. Tremulous.  I discussed the plan of care with patient and bedside RN.    Review of Systems  Review of Systems   Constitutional:  Negative for chills and fever.   HENT:  Negative for congestion, hearing loss and nosebleeds.    Eyes:  Negative for pain and redness.   Respiratory:  Negative for cough, hemoptysis, shortness of breath and wheezing.    Cardiovascular:  Positive for chest pain. Negative for palpitations.   Gastrointestinal:  " Positive for nausea and vomiting. Negative for abdominal pain, blood in stool, constipation and diarrhea.   Genitourinary:  Negative for dysuria, frequency and hematuria.   Musculoskeletal:  Negative for falls, joint pain and myalgias.   Skin:  Negative for rash.   Neurological:  Positive for dizziness. Negative for tremors, focal weakness, seizures, loss of consciousness, weakness and headaches.   Psychiatric/Behavioral:  The patient is not nervous/anxious and does not have insomnia.    All other systems reviewed and are negative.      Past Medical History   has no past medical history on file.    Surgical History   has no past surgical history on file.     Family History  family history is not on file.   Family history reviewed with patient. There is no family history that is pertinent to the chief complaint.     Social History   reports that he has quit smoking. He has never used smokeless tobacco. He reports current alcohol use. He reports current drug use.    Allergies  No Known Allergies    Medications  Prior to Admission Medications   Prescriptions Last Dose Informant Patient Reported? Taking?   predniSONE (DELTASONE) 20 MG Tab NEW RX at NOT STARTED  No No   Sig: Take one tab PO qd x 5d      Facility-Administered Medications: None       Physical Exam  Temp:  [36.9 °C (98.4 °F)] 36.9 °C (98.4 °F)  Pulse:  [80-84] 80  Resp:  [20] 20  BP: (141-152)/(87-97) 141/87  SpO2:  [90 %-92 %] 90 %  Blood Pressure: (!) 141/87   Temperature: 36.9 °C (98.4 °F)   Pulse: 80   Respiration: 20   Pulse Oximetry: 90 %       Physical Exam  Vitals and nursing note reviewed.   HENT:      Head: Normocephalic and atraumatic.      Right Ear: External ear normal.      Left Ear: External ear normal.      Nose: Nose normal.      Mouth/Throat:      Mouth: Mucous membranes are moist.   Eyes:      General: No scleral icterus.     Conjunctiva/sclera: Conjunctivae normal.   Cardiovascular:      Rate and Rhythm: Normal rate and regular rhythm.       Heart sounds: No murmur heard.     No friction rub. No gallop.   Pulmonary:      Effort: Pulmonary effort is normal.      Breath sounds: Normal breath sounds.   Abdominal:      General: Abdomen is flat. Bowel sounds are normal. There is no distension.      Palpations: Abdomen is soft.      Tenderness: There is no abdominal tenderness. There is no guarding.   Musculoskeletal:         General: Normal range of motion.      Cervical back: Normal range of motion and neck supple.   Skin:     General: Skin is warm.   Neurological:      Mental Status: He is alert and oriented to person, place, and time. Mental status is at baseline.      Coordination: Coordination abnormal (tremulous).   Psychiatric:         Mood and Affect: Mood normal.         Behavior: Behavior normal.         Thought Content: Thought content normal.         Judgment: Judgment normal.         Laboratory:  Recent Labs     03/03/24  0545   WBC 12.0*   RBC 5.19   HEMOGLOBIN 16.8   HEMATOCRIT 45.1   MCV 86.9   MCH 32.4   MCHC 37.3*   RDW 39.2   PLATELETCT 264   MPV 10.1     Recent Labs     03/03/24  0545   SODIUM 129*   POTASSIUM 3.7   CHLORIDE 92*   CO2 19*   GLUCOSE 129*   BUN 7*   CREATININE 0.66   CALCIUM 8.4*     Recent Labs     03/03/24  0545   ALTSGPT 17   ASTSGOT 33   ALKPHOSPHAT 107*   TBILIRUBIN 1.4   LIPASE 15   GLUCOSE 129*     Recent Labs     03/03/24  0545   APTT 25.5   INR 1.02     Recent Labs     03/03/24  0545   NTPROBNP 118         Recent Labs     03/03/24  0545 03/03/24  0719   TROPONINT 10 8       Imaging:  CT-CEREBRAL PERFUSION ANALYSIS   Final Result      1.  Cerebral blood flow less than 30% likely representing completed infarct = 0 mL.      2.  T Max more than 6 seconds likely representing combination of completed infarct and ischemia = 3 mL.      3.  Mismatched volume likely representing ischemic brain/penumbra = 3 mL      4.  Please note that the cerebral perfusion was performed on the limited brain tissue around the basal  ganglia region. Infarct/ischemia outside the CT perfusion sections can be missed in this study.      CT-CTA NECK WITH & W/O-POST PROCESSING   Final Result      No significant carotid or vertebral stenosis.      CT-CTA HEAD WITH & W/O-POST PROCESS   Final Result      No significant stenosis, acute arterial occlusion, or aneurysm identified.      CT-HEAD W/O   Final Result      No acute process.         DX-CHEST-PORTABLE (1 VIEW)   Final Result      Moderate left pleural effusion with superimposed left basilar atelectasis or airspace disease.          X-Ray:  I have personally reviewed the images and compared with prior images.    Assessment/Plan:  Justification for Admission Status  I anticipate this patient will require at least two midnights for appropriate medical management, necessitating inpatient admission because .chief    Patient will need a Telemetry bed on EMERGENCY service .  The need is secondary to complete work=ups for stroke and MI; monitor for withdrawal.    * Alcohol dependence with unspecified alcohol-induced disorder, cocaine abuse, dizziness/chest pain/nausea/vomiting (HCC)  Assessment & Plan  His dizziness and chest pain may be cocaine induced, complete work-ups with an MRI and echocardiogram. FOllow urine drug screen. NO beta-blockers  Detox bag, Librium and PRN Ativan      Pleural effusion, left  Assessment & Plan  Rule out infective cause.  Procalcitonin, empiric antibiotics (ideally before thoracentesis), US thoracentesis    Hyponatremia  Assessment & Plan  Probably beer potomania.   Fluid restriction, avoid free water, ordered urine osmority    QT prolongation  Assessment & Plan  Borderline  Ordered magnesium level  Ordered phenargan, avoid Zofran  Repeat EKG In the AM    Dizziness  Assessment & Plan  Resolved on admission  Giving IV fluids  MRI, Echo ordered    Other chest pain  Assessment & Plan  The ASCVD Risk score (Juan GALEANO, et al., 2019) failed to calculate for the following  reasons:    Cannot find a previous HDL lab    Cannot find a previous total cholesterol lab  Telemetry, trend troponins, ASA, ordered lipid panel, echo ordered              VTE prophylaxis: enoxaparin ppx

## 2024-03-03 NOTE — ASSESSMENT & PLAN NOTE
The ASCVD Risk score (Juan GALEANO, et al., 2019) failed to calculate for the following reasons:    Cannot find a previous HDL lab    Cannot find a previous total cholesterol lab  Telemetry, trend troponins, ASA, ordered lipid panel, echo ordered

## 2024-03-03 NOTE — ED NOTES
Assumed pt care. Pt assisted to standing to urinate. 500 ml emptied. Dizziness improved. Repeat troponin collected.

## 2024-03-03 NOTE — ED PROVIDER NOTES
"ED Provider Note    Scribed for Cierra Serra M.D. by Rita Cantu. 3/3/2024, 6:04 AM.    Primary care provider: Pcp Not In Computer  Means of arrival: EMS  History obtained from: Patient  History limited by: None    CHIEF COMPLAINT  Chief Complaint   Patient presents with    Dizziness     Dizziness x2 hours PTA.     HPI/DANNY  Garcia Davidson is a 58 y.o. male who presents to the Emergency Department for dizziness onset three hours ago. Patient states he was watching TV when his symptoms suddenly started. He describes his dizziness as \"head-spinning\" and feels like he might pass out. He notes additional left-sided chest pain and nausea.  He adds that he was able to get up and walk normally after this episode, as he walked to the ambulance.  Patient comments he has began feeling short of breath as well since arrival to the ED. Denies any previous incidents of similar symptoms. Denies any major past medical history or daily medications. Denies history of heart problems. Patient admits to vaping \"sometimes,\" alcohol use \"once or twice a week\" with his last drink being last night, and \"off and on\" cocaine use with the last being last night.    EXTERNAL RECORDS REVIEWED  None     LIMITATION TO HISTORY   Select: : None    OUTSIDE HISTORIAN(S):  None    PAST MEDICAL HISTORY  None pertinent.    SURGICAL HISTORY  patient denies any surgical history    SOCIAL HISTORY  Social History     Tobacco Use    Smoking status: Former    Smokeless tobacco: Never   Substance Use Topics    Alcohol use: Yes     Comment: 12-18 pack per day    Drug use: Yes     Comment: Cocaine use occ      Social History     Substance and Sexual Activity   Drug Use Yes    Comment: Cocaine use occ     FAMILY HISTORY  History reviewed. No pertinent family history.    CURRENT MEDICATIONS  Home Medications       Reviewed by Jerry Pollack R.N. (Registered Nurse) on 03/03/24 at 0544  Med List Status: Partial     Medication Last Dose Status   predniSONE " "(DELTASONE) 20 MG Tab  Active                  ALLERGIES  No Known Allergies    PHYSICAL EXAM  VITAL SIGNS: BP (!) 152/97   Pulse 84   Temp 36.9 °C (98.4 °F) (Temporal)   Resp 20   Ht 1.803 m (5' 11\")   Wt 90.7 kg (200 lb)   SpO2 92%   BMI 27.89 kg/m²   Vitals reviewed by myself.  Physical Exam  Nursing note and vitals reviewed.  Constitutional: Well-developed and well-nourished. No distress.   HENT: Head is normocephalic and atraumatic. Oropharynx is clear and moist without exudate or erythema.   Eyes: Pupils are equal, round, and reactive to light. No horizontal or vertical nystagmus. Conjunctiva are normal.   Cardiovascular: Normal rate and regular rhythm. No murmur heard. Normal radial pulses.  Pulmonary/Chest: Breath sounds normal. No wheezes or rales.   Abdominal: Soft and non-tender. No distention.    Musculoskeletal: Extremities exhibit normal range of motion without edema or tenderness. Patient ambulates with a normal narrow-based steady gait.   Neurological: Awake, alert and oriented to person, place, and time. No focal deficits noted. Cranial nerves II - XII intact. No pronator drift.  No dysmetria on cerebellar testing. Normal speech and language. Normal strength and sensation in bilateral upper and lower extremities. Slight tremors noted on the outstretched hands. NIH 0.  Skin: Skin is warm and dry. No rash.   Psychiatric: Normal mood and affect. Appropriate for clinical situation.     DIAGNOSTIC STUDIES:  LABS  Labs Reviewed   CBC WITH DIFFERENTIAL - Abnormal; Notable for the following components:       Result Value    WBC 12.0 (*)     MCHC 37.3 (*)     Neutrophils-Polys 86.60 (*)     Lymphocytes 7.20 (*)     Neutrophils (Absolute) 10.37 (*)     Lymphs (Absolute) 0.86 (*)     All other components within normal limits    Narrative:     Biotin intake of greater than 5 mg per day may interfere with  troponin levels, causing false low values.   COMP METABOLIC PANEL - Abnormal; Notable for the " following components:    Sodium 129 (*)     Chloride 92 (*)     Co2 19 (*)     Anion Gap 18.0 (*)     Glucose 129 (*)     Bun 7 (*)     Calcium 8.4 (*)     Alkaline Phosphatase 107 (*)     All other components within normal limits    Narrative:     Biotin intake of greater than 5 mg per day may interfere with  troponin levels, causing false low values.   URINE DRUG SCREEN - Abnormal; Notable for the following components:    Cocaine Metabolite Positive (*)     All other components within normal limits   SODIUM SERUM (NA) - Abnormal; Notable for the following components:    Sodium 128 (*)     All other components within normal limits    Narrative:     Biotin intake of greater than 5 mg per day may interfere with  troponin levels, causing false low values.   TROPONIN    Narrative:     Biotin intake of greater than 5 mg per day may interfere with  troponin levels, causing false low values.   TROPONIN    Narrative:     Biotin intake of greater than 5 mg per day may interfere with  troponin levels, causing false low values.   ESTIMATED GFR    Narrative:     Biotin intake of greater than 5 mg per day may interfere with  troponin levels, causing false low values.   PROTHROMBIN TIME    Narrative:     Indicate which anticoagulants the patient is on:->UNKNOWN   APTT    Narrative:     Indicate which anticoagulants the patient is on:->UNKNOWN   COD (ADULT)   DIAGNOSTIC ALCOHOL    Narrative:     Indicate which anticoagulants the patient is on:->UNKNOWN   LIPASE    Narrative:     Biotin intake of greater than 5 mg per day may interfere with  troponin levels, causing false low values.   PROBRAIN NATRIURETIC PEPTIDE, NT    Narrative:     Biotin intake of greater than 5 mg per day may interfere with  troponin levels, causing false low values.   ABO RH CONFIRM   TROPONIN    Narrative:     Biotin intake of greater than 5 mg per day may interfere with  troponin levels, causing false low values.   HEMOGLOBIN A1C   MAGNESIUM    Narrative:      Indicate which anticoagulants the patient is on:->UNKNOWN   OSMOLALITY URINE   PROCALCITONIN    Narrative:     Indicate which anticoagulants the patient is on:->UNKNOWN   FLUID PH   FLUID TOTAL PROTEIN   FLUID LDH   FLUID GLUCOSE   FLUID CELL COUNT   FLUID AMYLASE   CYTOLOGY   FLUID CULTURE W/GRAM STAIN   AFB CULTURE   FUNGAL CULTURE   SODIUM SERUM (NA)     All labs reviewed and independently interpreted by myself    EKG Interpretation:    12 Lead EKG independently interpreted by myself to show:  EKG at 5:43 AM: Normal sinus rhythm, heart rate 90, normal axis, borderline prolonged QT of 498, , , no acute ST-T segment changes, no evidence of acute arrhythmia or ischemia per my independent interpretation    RADIOLOGY  Final interpretation by radiology demonstrates:    CT-CEREBRAL PERFUSION ANALYSIS   Final Result      1.  Cerebral blood flow less than 30% likely representing completed infarct = 0 mL.      2.  T Max more than 6 seconds likely representing combination of completed infarct and ischemia = 3 mL.      3.  Mismatched volume likely representing ischemic brain/penumbra = 3 mL      4.  Please note that the cerebral perfusion was performed on the limited brain tissue around the basal ganglia region. Infarct/ischemia outside the CT perfusion sections can be missed in this study.      CT-CTA NECK WITH & W/O-POST PROCESSING   Final Result      No significant carotid or vertebral stenosis.      CT-CTA HEAD WITH & W/O-POST PROCESS   Final Result      No significant stenosis, acute arterial occlusion, or aneurysm identified.      CT-HEAD W/O   Final Result      No acute process.         DX-CHEST-PORTABLE (1 VIEW)   Final Result      Moderate left pleural effusion with superimposed left basilar atelectasis or airspace disease.        The radiologist's interpretation of all radiological studies have been reviewed by me.    COURSE & MEDICAL DECISION MAKING    ED Observation Status? No; Patient does not  meet criteria for ED Observation.     INITIAL ASSESSMENT, ED COURSE AND PLAN    Patient is a 58-year-old male who presents for evaluation of dizziness, nausea, shortness of breath and chest pain.  Differential diagnosis arrhythmia, stroke, TIA, alcohol withdrawal, cocaine side effect.  On arrival patient has an NIH of 0.  Low suspicion for large vessel stroke, however given the acute onset of dizziness and nausea will obtain CTA imaging of the head and neck as well as labs and EKG for further evaluation.    Patient's initial vitals notable for hypertension.  Patient is treated with IV fluids and Zofran after which she feels greatly improved.  EKG returns and demonstrates no evidence of acute arrhythmia or ischemia.  Chest x-ray returns and demonstrates a moderate left-sided pleural effusion of unclear etiology, patient is not having any infectious symptoms at this time, will need further workup.  CT imaging returns and demonstrates no acute processes in the head or neck.  Labs returned and are independently interpreted by myself to demonstrate:  -Troponin is within normal limits, patient has a heart score of 2 making him low risk for ACS however given recent cocaine use may have cardiomyopathy related to this  -Electrolytes notable for hyponatremia with a sodium of 129, patient is being treated with IV fluids  -Renal function within normal limits  -Anion gap is slightly elevated, bicarb slightly low, likely related to dehydration, patient is being treated with IV fluids  -Lipase is within normal limits ruling out pancreatitis  -Labs otherwise unremarkable    At this time unclear etiology of patient's dizziness and chest pain.  He does have a pleural effusion.  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  At this time patient will be hospitalized for ongoing workup and management.  I discussed the case with Dr. Guido who has accepted patient for hospitalization.  Patient is in guarded condition.       REASSESSMENTS      6:24 AM - Patient seen and examined at bedside. Discussed plan of care, including ordering for EKG, labs, and imaging to evaluate. Patient agrees to the plan of care. Differential diagnoses include but not limited to: See above.  HYDRATION: Based on the patient's presentation of Acute Vomiting and Other Nausea the patient was given IV fluids. IV Hydration was used because oral hydration was not adequate alone. Upon recheck following hydration, the patient was improved.     7:26 AM - Patient was reevaluated at bedside. Discussed lab and radiology results with the patient and updated on plan for hospitalization.      8:13 AM - I discussed the patient's case and the above findings with Dr. Guido (Hospitalist) who agrees to evaluate the patient for hospitalization.       DISPOSITION AND DISCUSSIONS  I have discussed management of the patient with the following physicians and LEONARD's:  Dr. Guido (Hospitalist)    Discussion of management with other [facility] or appropriate source(s): None     Escalation of care considered, and ultimately not performed: None    Barriers to care at this time, including but not limited to:  None known at this time .     Decision tools and prescription drugs considered including, but not limited to: see above.    DISPOSITION:  Patient will be hospitalized by Dr. Guido in guarded condition.    FINAL IMPRESSION  1. Nausea and vomiting, unspecified vomiting type    2. Dizziness    3. Hyponatremia    4. Chest pain, unspecified type    5. Pleural effusion    6. Dyspnea, unspecified type        Rita RUSSELL (Hans), am scribing for, and in the presence of, Cierra Serra M.D..    Electronically signed by: Rita Cantu (Hans), 3/3/2024    ICierra M.D. personally performed the services described in this documentation, as scribed by Rita Cantu in my presence, and it is both accurate and complete.    The note accurately reflects work and decisions made by me.  Cierra CARDONA  RASHI Serra  3/3/2024  1:19 PM

## 2024-03-03 NOTE — ASSESSMENT & PLAN NOTE
His dizziness and chest pain may be cocaine induced, complete work-ups with an MRI and echocardiogram. FOllow urine drug screen. NO beta-blockers  Detox bag, Librium and PRN Ativan

## 2024-03-04 ENCOUNTER — APPOINTMENT (OUTPATIENT)
Dept: RADIOLOGY | Facility: MEDICAL CENTER | Age: 59
DRG: 897 | End: 2024-03-04
Attending: INTERNAL MEDICINE

## 2024-03-04 VITALS
WEIGHT: 205.47 LBS | DIASTOLIC BLOOD PRESSURE: 75 MMHG | HEART RATE: 77 BPM | BODY MASS INDEX: 28.77 KG/M2 | HEIGHT: 71 IN | OXYGEN SATURATION: 95 % | RESPIRATION RATE: 18 BRPM | SYSTOLIC BLOOD PRESSURE: 124 MMHG | TEMPERATURE: 97.9 F

## 2024-03-04 LAB
ALBUMIN SERPL BCP-MCNC: 3.3 G/DL (ref 3.2–4.9)
BASOPHILS # BLD AUTO: 0.8 % (ref 0–1.8)
BASOPHILS # BLD: 0.03 K/UL (ref 0–0.12)
BUN SERPL-MCNC: 10 MG/DL (ref 8–22)
CALCIUM ALBUM COR SERPL-MCNC: 9.2 MG/DL (ref 8.5–10.5)
CALCIUM SERPL-MCNC: 8.6 MG/DL (ref 8.5–10.5)
CHLORIDE SERPL-SCNC: 104 MMOL/L (ref 96–112)
CO2 SERPL-SCNC: 24 MMOL/L (ref 20–33)
CREAT SERPL-MCNC: 0.95 MG/DL (ref 0.5–1.4)
EKG IMPRESSION: NORMAL
EKG IMPRESSION: NORMAL
EOSINOPHIL # BLD AUTO: 0.02 K/UL (ref 0–0.51)
EOSINOPHIL NFR BLD: 0.5 % (ref 0–6.9)
ERYTHROCYTE [DISTWIDTH] IN BLOOD BY AUTOMATED COUNT: 42.2 FL (ref 35.9–50)
ERYTHROCYTE [DISTWIDTH] IN BLOOD BY AUTOMATED COUNT: 42.7 FL (ref 35.9–50)
GFR SERPLBLD CREATININE-BSD FMLA CKD-EPI: 92 ML/MIN/1.73 M 2
GLUCOSE SERPL-MCNC: 106 MG/DL (ref 65–99)
HCT VFR BLD AUTO: 42.8 % (ref 42–52)
HCT VFR BLD AUTO: 43.4 % (ref 42–52)
HGB BLD-MCNC: 15.4 G/DL (ref 14–18)
HGB BLD-MCNC: 15.4 G/DL (ref 14–18)
IMM GRANULOCYTES # BLD AUTO: 0.01 K/UL (ref 0–0.11)
IMM GRANULOCYTES NFR BLD AUTO: 0.3 % (ref 0–0.9)
LYMPHOCYTES # BLD AUTO: 1.06 K/UL (ref 1–4.8)
LYMPHOCYTES NFR BLD: 27.9 % (ref 22–41)
MAGNESIUM SERPL-MCNC: 2.3 MG/DL (ref 1.5–2.5)
MCH RBC QN AUTO: 32.4 PG (ref 27–33)
MCH RBC QN AUTO: 32.4 PG (ref 27–33)
MCHC RBC AUTO-ENTMCNC: 35.5 G/DL (ref 32.3–36.5)
MCHC RBC AUTO-ENTMCNC: 36 G/DL (ref 32.3–36.5)
MCV RBC AUTO: 89.9 FL (ref 81.4–97.8)
MCV RBC AUTO: 91.2 FL (ref 81.4–97.8)
MONOCYTES # BLD AUTO: 0.57 K/UL (ref 0–0.85)
MONOCYTES NFR BLD AUTO: 15 % (ref 0–13.4)
NEUTROPHILS # BLD AUTO: 2.11 K/UL (ref 1.82–7.42)
NEUTROPHILS NFR BLD: 55.5 % (ref 44–72)
NRBC # BLD AUTO: 0 K/UL
NRBC BLD-RTO: 0 /100 WBC (ref 0–0.2)
PHOSPHATE SERPL-MCNC: 3 MG/DL (ref 2.5–4.5)
PLATELET # BLD AUTO: 192 K/UL (ref 164–446)
PLATELET # BLD AUTO: 202 K/UL (ref 164–446)
PMV BLD AUTO: 10.2 FL (ref 9–12.9)
PMV BLD AUTO: 10.5 FL (ref 9–12.9)
POTASSIUM SERPL-SCNC: 3.7 MMOL/L (ref 3.6–5.5)
RBC # BLD AUTO: 4.76 M/UL (ref 4.7–6.1)
RBC # BLD AUTO: 4.76 M/UL (ref 4.7–6.1)
SODIUM SERPL-SCNC: 138 MMOL/L (ref 135–145)
SODIUM SERPL-SCNC: 139 MMOL/L (ref 135–145)
WBC # BLD AUTO: 3.8 K/UL (ref 4.8–10.8)
WBC # BLD AUTO: 4.1 K/UL (ref 4.8–10.8)

## 2024-03-04 PROCEDURE — 84295 ASSAY OF SERUM SODIUM: CPT

## 2024-03-04 PROCEDURE — 83735 ASSAY OF MAGNESIUM: CPT

## 2024-03-04 PROCEDURE — 80069 RENAL FUNCTION PANEL: CPT

## 2024-03-04 PROCEDURE — 93010 ELECTROCARDIOGRAM REPORT: CPT | Performed by: INTERNAL MEDICINE

## 2024-03-04 PROCEDURE — 700111 HCHG RX REV CODE 636 W/ 250 OVERRIDE (IP): Mod: JZ | Performed by: INTERNAL MEDICINE

## 2024-03-04 PROCEDURE — 85025 COMPLETE CBC W/AUTO DIFF WBC: CPT

## 2024-03-04 PROCEDURE — 97161 PT EVAL LOW COMPLEX 20 MIN: CPT

## 2024-03-04 PROCEDURE — 700105 HCHG RX REV CODE 258: Performed by: INTERNAL MEDICINE

## 2024-03-04 PROCEDURE — 93010 ELECTROCARDIOGRAM REPORT: CPT | Mod: 77 | Performed by: INTERNAL MEDICINE

## 2024-03-04 PROCEDURE — 700102 HCHG RX REV CODE 250 W/ 637 OVERRIDE(OP): Performed by: INTERNAL MEDICINE

## 2024-03-04 PROCEDURE — 97165 OT EVAL LOW COMPLEX 30 MIN: CPT

## 2024-03-04 PROCEDURE — 70551 MRI BRAIN STEM W/O DYE: CPT

## 2024-03-04 PROCEDURE — 36415 COLL VENOUS BLD VENIPUNCTURE: CPT

## 2024-03-04 PROCEDURE — 99239 HOSP IP/OBS DSCHRG MGMT >30: CPT | Performed by: STUDENT IN AN ORGANIZED HEALTH CARE EDUCATION/TRAINING PROGRAM

## 2024-03-04 PROCEDURE — 85027 COMPLETE CBC AUTOMATED: CPT

## 2024-03-04 PROCEDURE — 93005 ELECTROCARDIOGRAM TRACING: CPT | Performed by: INTERNAL MEDICINE

## 2024-03-04 PROCEDURE — A9270 NON-COVERED ITEM OR SERVICE: HCPCS | Performed by: INTERNAL MEDICINE

## 2024-03-04 RX ADMIN — LORAZEPAM 1 MG: 2 INJECTION INTRAMUSCULAR; INTRAVENOUS at 08:31

## 2024-03-04 RX ADMIN — ASPIRIN 81 MG: 81 TABLET, COATED ORAL at 05:31

## 2024-03-04 RX ADMIN — CHLORDIAZEPOXIDE HYDROCHLORIDE 25 MG: 25 CAPSULE ORAL at 11:49

## 2024-03-04 RX ADMIN — AMPICILLIN AND SULBACTAM 3 G: 1; 2 INJECTION, POWDER, FOR SOLUTION INTRAMUSCULAR; INTRAVENOUS at 05:34

## 2024-03-04 RX ADMIN — CHLORDIAZEPOXIDE HYDROCHLORIDE 25 MG: 25 CAPSULE ORAL at 05:30

## 2024-03-04 RX ADMIN — THERA TABS 1 TABLET: TAB at 05:31

## 2024-03-04 RX ADMIN — FOLIC ACID 1 MG: 1 TABLET ORAL at 05:30

## 2024-03-04 RX ADMIN — AMPICILLIN AND SULBACTAM 3 G: 1; 2 INJECTION, POWDER, FOR SOLUTION INTRAMUSCULAR; INTRAVENOUS at 11:51

## 2024-03-04 RX ADMIN — Medication 100 MG: at 05:30

## 2024-03-04 ASSESSMENT — LIFESTYLE VARIABLES
NAUSEA AND VOMITING: NO NAUSEA AND NO VOMITING
VISUAL DISTURBANCES: NOT PRESENT
ORIENTATION AND CLOUDING OF SENSORIUM: ORIENTED AND CAN DO SERIAL ADDITIONS
ORIENTATION AND CLOUDING OF SENSORIUM: ORIENTED AND CAN DO SERIAL ADDITIONS
TREMOR: NO TREMOR
VISUAL DISTURBANCES: NOT PRESENT
TOTAL SCORE: 0
AGITATION: NORMAL ACTIVITY
HEADACHE, FULLNESS IN HEAD: NOT PRESENT
NAUSEA AND VOMITING: NO NAUSEA AND NO VOMITING
ORIENTATION AND CLOUDING OF SENSORIUM: ORIENTED AND CAN DO SERIAL ADDITIONS
ANXIETY: NO ANXIETY (AT EASE)
AGITATION: NORMAL ACTIVITY
ORIENTATION AND CLOUDING OF SENSORIUM: ORIENTED AND CAN DO SERIAL ADDITIONS
TREMOR: NO TREMOR
VISUAL DISTURBANCES: NOT PRESENT
NAUSEA AND VOMITING: NO NAUSEA AND NO VOMITING
ORIENTATION AND CLOUDING OF SENSORIUM: ORIENTED AND CAN DO SERIAL ADDITIONS
PAROXYSMAL SWEATS: NO SWEAT VISIBLE
PAROXYSMAL SWEATS: NO SWEAT VISIBLE
VISUAL DISTURBANCES: NOT PRESENT
ANXIETY: NO ANXIETY (AT EASE)
AGITATION: NORMAL ACTIVITY
TOTAL SCORE: 0
AGITATION: NORMAL ACTIVITY
HEADACHE, FULLNESS IN HEAD: NOT PRESENT
TOTAL SCORE: 0
TOTAL SCORE: 0
TREMOR: NO TREMOR
TREMOR: NO TREMOR
ANXIETY: NO ANXIETY (AT EASE)
AGITATION: NORMAL ACTIVITY
TREMOR: NO TREMOR
ANXIETY: NO ANXIETY (AT EASE)
AUDITORY DISTURBANCES: NOT PRESENT
AUDITORY DISTURBANCES: NOT PRESENT
NAUSEA AND VOMITING: NO NAUSEA AND NO VOMITING
AUDITORY DISTURBANCES: NOT PRESENT
HEADACHE, FULLNESS IN HEAD: NOT PRESENT
AUDITORY DISTURBANCES: NOT PRESENT
TOTAL SCORE: 0
PAROXYSMAL SWEATS: NO SWEAT VISIBLE
PAROXYSMAL SWEATS: NO SWEAT VISIBLE
HEADACHE, FULLNESS IN HEAD: NOT PRESENT
VISUAL DISTURBANCES: NOT PRESENT
NAUSEA AND VOMITING: NO NAUSEA AND NO VOMITING
AUDITORY DISTURBANCES: NOT PRESENT
HEADACHE, FULLNESS IN HEAD: NOT PRESENT
PAROXYSMAL SWEATS: NO SWEAT VISIBLE
ANXIETY: NO ANXIETY (AT EASE)

## 2024-03-04 ASSESSMENT — COGNITIVE AND FUNCTIONAL STATUS - GENERAL
MOBILITY SCORE: 24
SUGGESTED CMS G CODE MODIFIER DAILY ACTIVITY: CI
SUGGESTED CMS G CODE MODIFIER MOBILITY: CH
HELP NEEDED FOR BATHING: A LITTLE
DAILY ACTIVITIY SCORE: 23

## 2024-03-04 ASSESSMENT — FIBROSIS 4 INDEX: FIB4 SCORE: 2.5

## 2024-03-04 ASSESSMENT — GAIT ASSESSMENTS
DEVIATION: NO DEVIATION
DISTANCE (FEET): 200
GAIT LEVEL OF ASSIST: SUPERVISED

## 2024-03-04 ASSESSMENT — PAIN DESCRIPTION - PAIN TYPE: TYPE: ACUTE PAIN

## 2024-03-04 ASSESSMENT — ACTIVITIES OF DAILY LIVING (ADL): TOILETING: INDEPENDENT

## 2024-03-04 NOTE — THERAPY
Physical Therapy   Initial Evaluation     Patient Name: Garcia Davidson  Age:  58 y.o., Sex:  male  Medical Record #: [id number]  Today's Date: 3/4/2024          Assessment  Patient is 58 y.o. male who presented 3/3/2024 with sudden onset nausea, vomiting and dizziness.     Currently been managed for alcohol dependence/ alcohol induced disorder, cocaine abuse, L pleural effusion, hyponatremia, chest pain     PMH: vapes, drinks alcohol heavily (12pack beer a night last drank night prior to admission), does cocaine (used last night)     Patient seen for PT evaluation. Patient in bed, agreeable for the session. Able to demonstrate functional mobility tasks-bed mobility, sit-stand, chair transfers, ambulation & negotiating stairs as detailed below. Currently appears to be at his baseline level of functional mobility. Anticipate no further PT needs at this time.     Plan    Physical Therapy Initial Treatment Plan   Duration: Discharge Needs Only    DC Equipment Recommendations: None  Discharge Recommendations: Anticipate that the patient will have no further physical therapy needs after discharge from the hospital     Objective     03/04/24 1149   Initial Contact Note    Initial Contact Note Order Received and Verified, Physical Therapy Evaluation in Progress with Full Report to Follow.   Vitals   Pulse 76   Patient BP Position Sitting  (In the chair, post activity)   Blood Pressure 121/87   Pulse Oximetry 95 %   O2 Delivery Device None - Room Air   Pain   Pain Scales 0 to 10 Scale    Intervention Declines   Prior Living Situation   Prior Services Home-Independent   Housing / Facility 2 Story House   Steps Into Home 2   Steps In Home 15   Rail None;Both Rail (Steps in Home)   Equipment Owned None   Lives with - Patient's Self Care Capacity Alone and Able to Care For Self   Comments Patient mentioned that his brother, niece and nephew live in the town, able to assist him if needed   Prior Level of Functional Mobility   Bed  Mobility Independent   Transfer Status Independent   Ambulation Independent   Ambulation Distance Community   Assistive Devices Used None   Stairs Independent   Comments Patient mentioned that he sometimes goes upstairs to use the bathroom, but he does not have to.   History of Falls   History of Falls No   Cognition    Cognition / Consciousness WDL   Level of Consciousness Alert   Passive ROM Lower Body   Passive ROM Lower Body WDL   Active ROM Lower Body    Active ROM Lower Body  WDL   Strength Lower Body   Lower Body Strength  WDL   Sensation Lower Body   Lower Extremity Sensation   WDL   Comments Denies any numbness/tingling in LE at this time   Lower Body Muscle Tone   Lower Body Muscle Tone  WDL   Coordination Lower Body    Coordination Lower Body  WDL   Comments Heel to shin-WNL   Other Treatments   Other Treatments Provided Patient educated about importance of daily mobility, OOB to chair for meals and ambulate in the hallway multiple times during the day as able. Discussed DC concerns, patient has no concerns at this time.   Balance Assessment   Sitting Balance (Static) Normal   Sitting Balance (Dynamic) Good   Standing Balance (Static) Good   Standing Balance (Dynamic) Fair +   Weight Shift Sitting Normal   Weight Shift Standing Normal   Comments Seated EOB; Standing W/O AD   Bed Mobility    Supine to Sit Independent   Sit to Supine Independent   Scooting Independent   Rolling Independent   Comments HOB flat, W/O use of rails; towards L side   Gait Analysis   Gait Level Of Assist Supervised   Assistive Device None   Distance (Feet) 200   Deviation No deviation   # of Stairs Climbed 10   Level of Assist with Stairs Supervised   Comments Patient ambulated in the room, hallway with steady gait, no loss of balance. Negotiated stairs with 1 side rail, no loss of balance   Functional Mobility   Sit to Stand Independent   Bed, Chair, Wheelchair Transfer Independent   Transfer Method Stand Step   Mobility  Bed-EOB-ambulate in the room & hallway-negotiate stairs-ambulate in the hallway & room-EOB-Bed-EOB-chair   6 Clicks Assessment - How much HELP from from another person do you currently need... (If the patient hasn't done an activity recently, how much help from another person do you think he/she would need if he/she tried?)   Turning from your back to your side while in a flat bed without using bedrails? 4   Moving from lying on your back to sitting on the side of a flat bed without using bedrails? 4   Moving to and from a bed to a chair (including a wheelchair)? 4   Standing up from a chair using your arms (e.g., wheelchair, or bedside chair)? 4   Walking in hospital room? 4   Climbing 3-5 steps with a railing? 4   6 clicks Mobility Score 24   Activity Tolerance   Sitting in Chair Post session   Patient / Family Goals    Patient / Family Goal #1 To return home   Education Group   Education Provided Role of Physical Therapist   Role of Physical Therapist Patient Response Patient;Acceptance;Explanation;Verbal Demonstration   Physical Therapy Initial Treatment Plan    Duration Discharge Needs Only   Anticipated Discharge Equipment and Recommendations   DC Equipment Recommendations None   Discharge Recommendations Anticipate that the patient will have no further physical therapy needs after discharge from the hospital   Interdisciplinary Plan of Care Collaboration   IDT Collaboration with  Nursing;Occupational Therapist;Certified Nursing Assistant   Patient Position at End of Therapy Seated;Chair Alarm On;Call Light within Reach;Tray Table within Reach   Session Information   Date / Session Number  3/4-Eval done, DC needs only

## 2024-03-04 NOTE — HOSPITAL COURSE
"Garcia Davidson is a 58 y.o. male who presented 3/3/2024 with Dizziness (Dizziness x2 hours PTA.)  58 year old male who vapes, drinks alcohol heavily (12pack beer a night last drank night prior to admission) and does cocaine (used last night), presented 3/3/2024 sudden onset nausea, vomiting and dizziness.  At 3AM, he felt L sided chest pain, nauseous, retched, dizzy and shaky. No headaches, fever, chills or cough. No history of stroke or heart attacks. His brother and dad have history of heart attack.   At the ED, afebrile, hemodynamically stable. Mild hypoxia at 90%  CXR on my personal review showed moderate L pleural effusion.  EKG on my personal review nonspecific ST changes with no obvious sign of ischemia.  Mild leukocytosis. Na 129, bicarb 19, Cr- 0.66. Troponin negative, BNP, within normal limits  Neurologically nonfocal, tremors per EDP. \"Stroke work-up\" initiated for dizziness, CT head no acute process, CTA head and neck showed no significant stenosis, acute arterial occlusion, or aneurysm. No significant carotid or vertebral stenosis. CT perfusion showed mismatch volume showing \"ischemic brain/penumbra\" at 3mL  When I saw him at ED no acute distress. No gross motor deficits. Tremulous.  I discussed the plan of care with patient and bedside RN.     "

## 2024-03-04 NOTE — THERAPY
"Occupational Therapy   Initial Evaluation     Patient Name: Garcia Davidson  Age:  58 y.o., Sex:  male  Medical Record #: [id number]  Today's Date: 3/4/2024     Precautions  Precautions: Fall Risk    Assessment  Patient is 58 y.o. male admitted for CVA w/u d/t 2hrs of dizziness, N/V, and CP found to have L pleural effusion not requiring thoracentesis, \"ischemic brain/penumbra\", hyponatremia, and (+) for ETOH and cocaine. Per chart, dizziness and chest pain may be cocaine induced, and MRI (-). PMHX of ETOH/polysubstance abuse. Completed ADLs/txfs with SPV and functional ambulation w/o AD and SPV; reports he is back at his functional baseline. Patient will not be actively followed for occupational therapy services at this time, however may be seen if requested by physician for 1 more visit within 30 days to address any discharge or equipment needs.     Plan    Occupational Therapy Initial Treatment Plan   Duration: Discharge Needs Only    DC Equipment Recommendations: Tub / Shower Seat  Discharge Recommendations: Anticipate that the patient will have no further occupational therapy needs after discharge from the hospital (reports is baseline)      Objective     03/04/24 1026   Prior Living Situation   Prior Services Home-Independent   Housing / Facility 2 Story House   Steps Into Home 2   Steps In Home   (FOS)   Bathroom Set up Bathtub / Shower Combination   Equipment Owned None   Lives with - Patient's Self Care Capacity Alone and Able to Care For Self   Comments Reports has family that could assist PRN.   Prior Level of ADL Function   Self Feeding Independent   Grooming / Hygiene Independent   Bathing Independent   Dressing Independent   Toileting Independent   Prior Level of IADL Function   Medication Management Independent   Laundry Independent   Kitchen Mobility Independent   Finances Independent   Home Management Independent   Shopping Independent   Prior Level Of Mobility Independent Without Device in " Community;Independent Without Device in Home   Driving / Transportation Driving Independent   Precautions   Precautions Fall Risk   Vitals   O2 Delivery Device None - Room Air   Pain 0 - 10 Group   Therapist Pain Assessment Post Activity Pain Same as Prior to Activity;During Activity;Nurse Notified  (no c/o pain)   Cognition    Cognition / Consciousness WDL   Level of Consciousness Alert   Comments pleasant and cooperative; eager to d/c home   Passive ROM Upper Body   Passive ROM Upper Body WDL   Active ROM Upper Body   Active ROM Upper Body  WDL   Strength Upper Body   Upper Body Strength  WDL   Sensation Upper Body   Upper Extremity Sensation  WDL   Coordination Upper Body   Coordination WDL   Balance Assessment   Sitting Balance (Static) Good   Sitting Balance (Dynamic) Good   Standing Balance (Static) Fair +   Standing Balance (Dynamic) Fair +   Weight Shift Sitting Good   Weight Shift Standing Good   Comments w/o AD   Bed Mobility    Supine to Sit Supervised   Sit to Supine Supervised   Scooting Supervised   Comments HOB elevated   ADL Assessment   Eating Supervision   Grooming Supervision   Lower Body Dressing Supervision   Toileting Supervision   Comments Pt reports he is back at baseline   mRS Prior to admission   Prior to admission mRS 0   Modified Greenlee (mRS)   Modified Dorota Score 0   Functional Mobility   Sit to Stand Supervised   Bed, Chair, Wheelchair Transfer Supervised   Toilet Transfers Supervised   Tub / Shower Transfers Supervised  (simulated with trash can)   Transfer Method Stand Step   Mobility w/o AD; bed>toilet>sink>hallway>BTB   Visual Perception   Visual Perception  WDL   Edema / Skin Assessment   Edema / Skin  Not Assessed   Activity Tolerance   Comments no c/o pain or fatigue   Education Group   Education Provided Role of Occupational Therapist;Transfers;Pathology of bedrest   Role of Occupational Therapist Patient Response Patient;Acceptance;Explanation;Verbal  Demonstration;Reinforcement Needed   Transfers Patient Response Patient;Acceptance;Explanation;Verbal Demonstration;Reinforcement Needed   Pathology of Bedrest Patient Response Patient;Acceptance;Explanation;Verbal Demonstration;Reinforcement Needed

## 2024-03-04 NOTE — PROGRESS NOTES
Pt transferred to MRI with transport via wheelchair. Tele monitor tech, Will, was notified by transport. Tele box with pt. Will acknowledged.

## 2024-03-04 NOTE — PROGRESS NOTES
"Pt back from Ultrasound. Spoke to US tech. Per US-Chest: \"no evidence of pleural effusion.\"   Thoracentesis not needed.       "

## 2024-03-04 NOTE — PROGRESS NOTES
Monitor summary: SB/SR 57-74, WY -0.14, QRS -0.08, QT -0.41, with rare PVCs per strip from the monitor room.

## 2024-03-04 NOTE — CARE PLAN
The patient is Stable - Low risk of patient condition declining or worsening    Shift Goals  Clinical Goals: Stoke & MI workup, CIWA  Patient Goals: rest    Progress made toward(s) clinical / shift goals:    Problem: Optimal Care of the Stroke Patient  Goal: Optimal emergency care for the stroke patient  Outcome: Progressing     Problem: Knowledge Deficit - Stroke Education  Goal: Patient's knowledge of stroke and risk factors will improve  Outcome: Progressing     Problem: Psychosocial - Patient Condition  Goal: Patient's ability to verbalize feelings about condition will improve  Outcome: Progressing     Problem: Neuro Status  Goal: Neuro status will remain stable or improve  Outcome: Progressing     Problem: Risk for Aspiration  Goal: Patient's risk for aspiration will be absent or decrease  Outcome: Progressing     Problem: Self Care  Goal: Patient will have the ability to perform ADLs independently or with assistance (bathe, groom, dress, toilet and feed)  Outcome: Progressing     Problem: Optimal Care for Alcohol Withdrawal  Goal: Optimal Care for the alcohol withdrawal patient  Outcome: Progressing     Problem: Seizure Precautions  Goal: Implementation of seizure precautions  Outcome: Progressing     Problem: Pain - Standard  Goal: Alleviation of pain or a reduction in pain to the patient’s comfort goal  Outcome: Progressing     Problem: Fall Risk  Goal: Patient will remain free from falls  Outcome: Progressing       Patient is not progressing towards the following goals:

## 2024-03-05 ENCOUNTER — TELEPHONE (OUTPATIENT)
Dept: HEALTH INFORMATION MANAGEMENT | Facility: OTHER | Age: 59
End: 2024-03-05

## 2024-03-05 NOTE — CARE PLAN
The patient is Stable - Low risk of patient condition declining or worsening    Shift Goals  Clinical Goals: safety, MRI  Patient Goals: DC  Family Goals: romario    Progress made toward(s) clinical / shift goals:    Problem: Optimal Care of the Stroke Patient  Goal: Optimal emergency care for the stroke patient  Outcome: Progressing     Problem: Knowledge Deficit - Stroke Education  Goal: Patient's knowledge of stroke and risk factors will improve  Outcome: Progressing     Problem: Psychosocial - Patient Condition  Goal: Patient's ability to verbalize feelings about condition will improve  Outcome: Progressing     Problem: Discharge Planning - Stroke  Goal: Ensure Stroke Core Measures are met prior to discharge  Outcome: Progressing     Problem: Neuro Status  Goal: Neuro status will remain stable or improve  Outcome: Progressing     Problem: Hemodynamic Monitoring  Goal: Patient's hemodynamics, fluid balance and neurologic status will be stable or improve  Outcome: Progressing     Problem: Risk for Aspiration  Goal: Patient's risk for aspiration will be absent or decrease  Outcome: Progressing       Patient is not progressing towards the following goals:

## 2024-03-05 NOTE — DISCHARGE SUMMARY
"Discharge Summary    CHIEF COMPLAINT ON ADMISSION  Chief Complaint   Patient presents with    Dizziness     Dizziness x2 hours PTA.       Reason for Admission  EMS     Admission Date  3/3/2024    CODE STATUS  Full Code    HPI & HOSPITAL COURSE    Garcia Davidson is a 58 y.o. male who presented 3/3/2024 with Dizziness (Dizziness x2 hours PTA.)  58 year old male who vapes, drinks alcohol heavily (12pack beer a night last drank night prior to admission) and does cocaine (used last night), presented 3/3/2024 sudden onset nausea, vomiting and dizziness.  At 3AM, he felt L sided chest pain, nauseous, retched, dizzy and shaky. No headaches, fever, chills or cough. No history of stroke or heart attacks. His brother and dad have history of heart attack.   At the ED, afebrile, hemodynamically stable. Mild hypoxia at 90%  CXR on my personal review showed moderate L pleural effusion.  EKG on my personal review nonspecific ST changes with no obvious sign of ischemia.  Mild leukocytosis. Na 129, bicarb 19, Cr- 0.66. Troponin negative, BNP, within normal limits  Neurologically nonfocal, tremors per EDP. \"Stroke work-up\" initiated for dizziness, CT head no acute process, CTA head and neck showed no significant stenosis, acute arterial occlusion, or aneurysm. No significant carotid or vertebral stenosis. CT perfusion showed mismatch volume showing \"ischemic brain/penumbra\" at 3mL  When I saw him at ED no acute distress. No gross motor deficits. Tremulous.  I discussed the plan of care with patient and bedside RN.     3/4  Evaluated in his inpatient room, afebrile normal pulse and respiratory rate normal blood pressure, has been 91 to 93% on 0.5 L nasal cannula, weaned down to room air maintaining 91%.  Blood work shows mild leukopenia glucose 106 otherwise normal BMP.  MRI brain pending.  TTE shows normal left and right ventricular function, LVEF 60 to 65%, no obvious segmental wall motion abnormalities, no " valvulopathy.  Bilateral ultrasound chest negative for pleural effusions, ultrasound showed atelectatic lung, and symptoms pulmonary ordered. MRI negative for stroke.   Patient requesting discharge    Therefore, he is discharged in good and stable condition to home with close outpatient follow-up.    The patient recovered much more quickly than anticipated on admission.    Discharge Date  3/4/2024    FOLLOW UP ITEMS POST DISCHARGE  PCP follow up    DISCHARGE DIAGNOSES  Principal Problem:    Alcohol dependence with unspecified alcohol-induced disorder, cocaine abuse, dizziness/chest pain/nausea/vomiting (HCC) (POA: Unknown)  Active Problems:    Other chest pain (POA: Unknown)    Dizziness (POA: Unknown)    QT prolongation (POA: Unknown)    Hyponatremia (POA: Unknown)    Pleural effusion, left (POA: Unknown)  Resolved Problems:    * No resolved hospital problems. *      FOLLOW UP  No future appointments.  No follow-up provider specified.    MEDICATIONS ON DISCHARGE     Medication List        CONTINUE taking these medications        Instructions   predniSONE 20 MG Tabs  Commonly known as: Deltasone   Take one tab PO qd x 5d              Allergies  No Known Allergies    DIET  Orders Placed This Encounter   Procedures    Diet Order Diet: Cardiac; Fluid modifications: (optional): 1200 ml Fluid Restriction     Standing Status:   Standing     Number of Occurrences:   1     Order Specific Question:   Diet:     Answer:   Cardiac [6]     Order Specific Question:   Fluid modifications: (optional)     Answer:   1200 ml Fluid Restriction [8]       ACTIVITY  As tolerated.  Weight bearing as tolerated    CONSULTATIONS  None    PROCEDURES  None    LABORATORY  Lab Results   Component Value Date    SODIUM 139 03/04/2024    POTASSIUM 3.7 03/04/2024    CHLORIDE 104 03/04/2024    CO2 24 03/04/2024    GLUCOSE 106 (H) 03/04/2024    BUN 10 03/04/2024    CREATININE 0.95 03/04/2024        Lab Results   Component Value Date    WBC 3.8 (L)  03/04/2024    HEMOGLOBIN 15.4 03/04/2024    HEMATOCRIT 42.8 03/04/2024    PLATELETCT 202 03/04/2024        Total time of the discharge process exceeds 35 minutes.

## 2024-03-05 NOTE — PROGRESS NOTES
Pt DC per order. Pt acknowledged all risks of alcohol induced disorder and illegal drug use. Education given. All questions are answered.

## 2024-03-05 NOTE — DISCHARGE INSTRUCTIONS
Diet    Resume your normal diet as tolerated.  A diet low in cholesterol, fat, and sodium is recommended for good health.     Activity    Resume Your Normal Activity    You may resume your normal activity as tolerated.  Rest as needed.

## 2024-03-05 NOTE — PROGRESS NOTES
Monitor summary: SR 56-72, CT .18, QRS .09, QT .46, per strip from the monitor room.